# Patient Record
Sex: FEMALE | Race: WHITE | NOT HISPANIC OR LATINO | URBAN - METROPOLITAN AREA
[De-identification: names, ages, dates, MRNs, and addresses within clinical notes are randomized per-mention and may not be internally consistent; named-entity substitution may affect disease eponyms.]

---

## 2017-10-18 ENCOUNTER — OFFICE VISIT (OUTPATIENT)
Dept: CARDIOLOGY | Facility: MEDICAL CENTER | Age: 67
End: 2017-10-18
Payer: MEDICARE

## 2017-10-18 VITALS
DIASTOLIC BLOOD PRESSURE: 70 MMHG | HEART RATE: 56 BPM | BODY MASS INDEX: 34.91 KG/M2 | WEIGHT: 197 LBS | HEIGHT: 63 IN | SYSTOLIC BLOOD PRESSURE: 140 MMHG

## 2017-10-18 DIAGNOSIS — R06.09 DOE (DYSPNEA ON EXERTION): ICD-10-CM

## 2017-10-18 DIAGNOSIS — R60.0 LOCALIZED EDEMA: ICD-10-CM

## 2017-10-18 DIAGNOSIS — I10 BENIGN ESSENTIAL HYPERTENSION: Chronic | ICD-10-CM

## 2017-10-18 DIAGNOSIS — R07.9 CHEST PAIN, UNSPECIFIED TYPE: ICD-10-CM

## 2017-10-18 DIAGNOSIS — E78.2 MIXED HYPERLIPIDEMIA: Chronic | ICD-10-CM

## 2017-10-18 PROBLEM — R60.9 EDEMA: Status: ACTIVE | Noted: 2017-10-18

## 2017-10-18 LAB — EKG IMPRESSION: NORMAL

## 2017-10-18 PROCEDURE — 93000 ELECTROCARDIOGRAM COMPLETE: CPT | Performed by: INTERNAL MEDICINE

## 2017-10-18 PROCEDURE — 99203 OFFICE O/P NEW LOW 30 MIN: CPT | Performed by: INTERNAL MEDICINE

## 2017-10-18 RX ORDER — LISINOPRIL 20 MG/1
20 TABLET ORAL DAILY
Qty: 90 TAB | Refills: 3 | Status: SHIPPED | OUTPATIENT
Start: 2017-10-18 | End: 2020-03-05 | Stop reason: SDUPTHER

## 2017-10-18 RX ORDER — LISINOPRIL 20 MG/1
20 TABLET ORAL DAILY
COMMUNITY
End: 2017-10-18 | Stop reason: SDUPTHER

## 2017-10-18 RX ORDER — BUMETANIDE 0.5 MG/1
1 TABLET ORAL DAILY
Qty: 30 TAB | Refills: 3 | Status: SHIPPED | OUTPATIENT
Start: 2017-10-18 | End: 2017-11-07 | Stop reason: SDUPTHER

## 2017-10-18 ASSESSMENT — ENCOUNTER SYMPTOMS
SHORTNESS OF BREATH: 1
BRUISES/BLEEDS EASILY: 0
GASTROINTESTINAL NEGATIVE: 1
DEPRESSION: 1
CONSTITUTIONAL NEGATIVE: 1
NEUROLOGICAL NEGATIVE: 1
MUSCULOSKELETAL NEGATIVE: 1
BLOOD IN STOOL: 0

## 2017-10-18 NOTE — PROGRESS NOTES
Subjective:   Sneha Jade is a 67 y.o. female whoIs self-referred for evaluation of chest pain, exertional dyspnea and edema. She was seen once in the office in  for history of hypertension. Prior to that the patient has a long history of hypertension but has not been taking medications. At the at that time she also had mild edema. The patient was started on lisinopril 10 mg twice daily. She is currently lisinopril 20 mg a day.  About 6 weeks ago she started noticing exertional dyspnea and also after the edema that was better in the morning. She can walk a block or 2 before getting short of breath.  She is also noted left upper chest pain that is localized, not particularly positional but made worse with deep breathing.  Her cardiac risk factor profile is also for hypertension, hyperlipidemia and premature coronary disease in the family. No history of diabetes or smoking. Her dad  from a heart attack in his 50s.  She denies palpitations, orthopnea, or syncope.    Past Medical History:   Diagnosis Date   • Arm pain, left 2015   • Benign essential hypertension 2010   • Carotid artery stenosis 2/10/2010   • HLD (hyperlipidemia) 2/10/2010     Past Surgical History:   Procedure Laterality Date   • APPENDECTOMY     • TUBAL COAGULATION LAPAROSCOPIC BILATERAL       Family History   Problem Relation Age of Onset   • Heart Disease Mother    • Heart Attack Mother    • Heart Attack Father 62     History   Smoking Status   • Never Smoker   Smokeless Tobacco   • Never Used     Allergies   Allergen Reactions   • Pcn [Penicillins]      Outpatient Encounter Prescriptions as of 10/18/2017   Medication Sig Dispense Refill   • lisinopril (PRINIVIL) 20 MG Tab Take 1 Tab by mouth every day. 90 Tab 3   • bumetanide (BUMEX) 0.5 MG Tab Take 2 Tabs by mouth every day. 30 Tab 3   • [DISCONTINUED] lisinopril (PRINIVIL) 20 MG Tab Take 20 mg by mouth every day.       No facility-administered encounter medications on file  "as of 10/18/2017.      Review of Systems   Constitutional: Negative.    HENT: Negative.    Respiratory: Positive for shortness of breath.         Exertional dyspnea   Cardiovascular: Positive for chest pain and leg swelling.        Localized chest pain   Gastrointestinal: Negative.  Negative for blood in stool.   Musculoskeletal: Negative.    Skin: Negative.    Neurological: Negative.    Endo/Heme/Allergies: Negative.  Does not bruise/bleed easily.   Psychiatric/Behavioral: Positive for depression.        Objective:   /70   Pulse (!) 56   Ht 1.6 m (5' 3\")   Wt 89.4 kg (197 lb)   BMI 34.90 kg/m²     Physical Exam   Constitutional: She is oriented to person, place, and time. She appears well-developed and well-nourished. No distress.   Overweight   HENT:   Head: Atraumatic.   Eyes: Conjunctivae and EOM are normal. Pupils are equal, round, and reactive to light. No scleral icterus.   Neck: Neck supple. JVD present. No thyromegaly present.   Cardiovascular: Normal rate, regular rhythm and normal heart sounds.    No murmur heard.  Pulmonary/Chest: Effort normal and breath sounds normal. No respiratory distress. She has no wheezes. She has no rales.   Abdominal: Soft. Bowel sounds are normal. She exhibits no distension. There is no tenderness.   Musculoskeletal: She exhibits edema.   Trace to 1+ edema   Neurological: She is alert and oriented to person, place, and time.   Skin: Skin is warm and dry. She is not diaphoretic.   Psychiatric: She has a normal mood and affect.       Assessment:     1. Chest pain, unspecified type  EKG    CBC WITH DIFFERENTIAL   2. Benign essential hypertension  COMP METABOLIC PANEL    LIPID PROFILE   3. Mixed hyperlipidemia  LIPID PROFILE   4. Localized edema  COMP METABOLIC PANEL   5. MERCADO (dyspnea on exertion)  COMP METABOLIC PANEL    CBC WITH DIFFERENTIAL    BTYPE NATRIURETIC PEPTIDE    ECHOCARDIOGRAM COMP W/O CONT       Medical Decision Making:  Today's Assessment / Status / Plan: "   Her exam is notable for mild JVD. Cardiac exam is benign. She has trace to 1+ edema. It may be that she developed LV dysfunction from years of hypertension with a long period of untreated hypertension. Other etiologies need to be considered as well including anemia and kidney disease. Appropriate laboratories will be drawn including CBC, BNP, and CMP. An echo be obtained to evaluate her LV function.  She has history of chest pain and on exam has very definite left 4th costochondral pain to palpation which mimics her pain that she been having. I don't think she is having anginal pain but should have stress testing.  The patient has known carotid artery disease and had moderate bilateral carotid stenosis by ultrasound back in 2010. At some point this should be repeated.  She was started on bumetanide 0.5 mg daily in addition to her continued lisinopril. Recommend checking potassium and  Visit. She'll be notified of the results of her lab and echo.

## 2017-10-18 NOTE — LETTER
[facility]   [address] [place] [address] [address]  [place] [address]  Phone: [phone]  Fax: [phone]              Sneha Jade  1950    Encounter Date: 10/18/2017    Frederic Boateng M.D.          PROGRESS NOTE:  Subjective:   Sneha Jade is a 67 y.o. female whoIs self-referred for evaluation of chest pain, exertional dyspnea and edema. She was seen once in the office in  for history of hypertension. Prior to that the patient has a long history of hypertension but has not been taking medications. At the at that time she also had mild edema. The patient was started on lisinopril 10 mg twice daily. She is currently lisinopril 20 mg a day.  About 6 weeks ago she started noticing exertional dyspnea and also after the edema that was better in the morning. She can walk a block or 2 before getting short of breath.  She is also noted left upper chest pain that is localized, not particularly positional but made worse with deep breathing.  Her cardiac risk factor profile is also for hypertension, hyperlipidemia and premature coronary disease in the family. No history of diabetes or smoking. Her dad  from a heart attack in his 50s.  She denies palpitations, orthopnea, or syncope.    Past Medical History:   Diagnosis Date   • Arm pain, left 2015   • Benign essential hypertension 2010   • Carotid artery stenosis 2/10/2010   • HLD (hyperlipidemia) 2/10/2010     Past Surgical History:   Procedure Laterality Date   • APPENDECTOMY     • TUBAL COAGULATION LAPAROSCOPIC BILATERAL       Family History   Problem Relation Age of Onset   • Heart Disease Mother    • Heart Attack Mother    • Heart Attack Father 62     History   Smoking Status   • Never Smoker   Smokeless Tobacco   • Never Used     Allergies   Allergen Reactions   • Pcn [Penicillins]      Outpatient Encounter Prescriptions as of 10/18/2017   Medication Sig Dispense Refill   • lisinopril (PRINIVIL) 20 MG Tab  "Take 1 Tab by mouth every day. 90 Tab 3   • bumetanide (BUMEX) 0.5 MG Tab Take 2 Tabs by mouth every day. 30 Tab 3   • [DISCONTINUED] lisinopril (PRINIVIL) 20 MG Tab Take 20 mg by mouth every day.       No facility-administered encounter medications on file as of 10/18/2017.      Review of Systems   Constitutional: Negative.    HENT: Negative.    Respiratory: Positive for shortness of breath.         Exertional dyspnea   Cardiovascular: Positive for chest pain and leg swelling.        Localized chest pain   Gastrointestinal: Negative.  Negative for blood in stool.   Musculoskeletal: Negative.    Skin: Negative.    Neurological: Negative.    Endo/Heme/Allergies: Negative.  Does not bruise/bleed easily.   Psychiatric/Behavioral: Positive for depression.        Objective:   /70   Pulse (!) 56   Ht 1.6 m (5' 3\")   Wt 89.4 kg (197 lb)   BMI 34.90 kg/m²      Physical Exam   Constitutional: She is oriented to person, place, and time. She appears well-developed and well-nourished. No distress.   Overweight   HENT:   Head: Atraumatic.   Eyes: Conjunctivae and EOM are normal. Pupils are equal, round, and reactive to light. No scleral icterus.   Neck: Neck supple. JVD present. No thyromegaly present.   Cardiovascular: Normal rate, regular rhythm and normal heart sounds.    No murmur heard.  Pulmonary/Chest: Effort normal and breath sounds normal. No respiratory distress. She has no wheezes. She has no rales.   Abdominal: Soft. Bowel sounds are normal. She exhibits no distension. There is no tenderness.   Musculoskeletal: She exhibits edema.   Trace to 1+ edema   Neurological: She is alert and oriented to person, place, and time.   Skin: Skin is warm and dry. She is not diaphoretic.   Psychiatric: She has a normal mood and affect.       Assessment:     1. Chest pain, unspecified type  EKG    CBC WITH DIFFERENTIAL   2. Benign essential hypertension  COMP METABOLIC PANEL    LIPID PROFILE   3. Mixed hyperlipidemia  LIPID " PROFILE   4. Localized edema  COMP METABOLIC PANEL   5. MERCADO (dyspnea on exertion)  COMP METABOLIC PANEL    CBC WITH DIFFERENTIAL    BTYPE NATRIURETIC PEPTIDE    ECHOCARDIOGRAM COMP W/O CONT       Medical Decision Making:  Today's Assessment / Status / Plan:   Her exam is notable for mild JVD. Cardiac exam is benign. She has trace to 1+ edema. It may be that she developed LV dysfunction from years of hypertension with a long period of untreated hypertension. Other etiologies need to be considered as well including anemia and kidney disease. Appropriate laboratories will be drawn including CBC, BNP, and CMP. An echo be obtained to evaluate her LV function.  She has history of chest pain and on exam has very definite left 4th costochondral pain to palpation which mimics her pain that she been having. I don't think she is having anginal pain but should have stress testing.  The patient has known carotid artery disease and had moderate bilateral carotid stenosis by ultrasound back in 2010. At some point this should be repeated.  She was started on bumetanide 0.5 mg daily in addition to her continued lisinopril. Recommend checking potassium and  Visit. She'll be notified of the results of her lab and echo.      ZORAN Rodríguez.  [address]  [facility] [address]  VIA In Basket

## 2017-10-23 ENCOUNTER — HOSPITAL ENCOUNTER (OUTPATIENT)
Dept: LAB | Facility: MEDICAL CENTER | Age: 67
End: 2017-10-23
Attending: INTERNAL MEDICINE
Payer: MEDICARE

## 2017-10-23 DIAGNOSIS — R60.0 LOCALIZED EDEMA: ICD-10-CM

## 2017-10-23 DIAGNOSIS — E78.2 MIXED HYPERLIPIDEMIA: Chronic | ICD-10-CM

## 2017-10-23 DIAGNOSIS — R06.09 DOE (DYSPNEA ON EXERTION): ICD-10-CM

## 2017-10-23 DIAGNOSIS — I10 BENIGN ESSENTIAL HYPERTENSION: Chronic | ICD-10-CM

## 2017-10-23 DIAGNOSIS — R07.9 CHEST PAIN, UNSPECIFIED TYPE: ICD-10-CM

## 2017-10-23 LAB
ALBUMIN SERPL BCP-MCNC: 4.1 G/DL (ref 3.2–4.9)
ALBUMIN/GLOB SERPL: 1.4 G/DL
ALP SERPL-CCNC: 26 U/L (ref 30–99)
ALT SERPL-CCNC: 19 U/L (ref 2–50)
ANION GAP SERPL CALC-SCNC: 6 MMOL/L (ref 0–11.9)
AST SERPL-CCNC: 21 U/L (ref 12–45)
BASOPHILS # BLD AUTO: 1.2 % (ref 0–1.8)
BASOPHILS # BLD: 0.09 K/UL (ref 0–0.12)
BILIRUB SERPL-MCNC: 0.5 MG/DL (ref 0.1–1.5)
BNP SERPL-MCNC: 45 PG/ML (ref 0–100)
BUN SERPL-MCNC: 17 MG/DL (ref 8–22)
CALCIUM SERPL-MCNC: 9.9 MG/DL (ref 8.5–10.5)
CHLORIDE SERPL-SCNC: 108 MMOL/L (ref 96–112)
CHOLEST SERPL-MCNC: 191 MG/DL (ref 100–199)
CO2 SERPL-SCNC: 27 MMOL/L (ref 20–33)
CREAT SERPL-MCNC: 1.03 MG/DL (ref 0.5–1.4)
EOSINOPHIL # BLD AUTO: 0.18 K/UL (ref 0–0.51)
EOSINOPHIL NFR BLD: 2.4 % (ref 0–6.9)
ERYTHROCYTE [DISTWIDTH] IN BLOOD BY AUTOMATED COUNT: 41.5 FL (ref 35.9–50)
GFR SERPL CREATININE-BSD FRML MDRD: 54 ML/MIN/1.73 M 2
GLOBULIN SER CALC-MCNC: 2.9 G/DL (ref 1.9–3.5)
GLUCOSE SERPL-MCNC: 99 MG/DL (ref 65–99)
HCT VFR BLD AUTO: 42.4 % (ref 37–47)
HDLC SERPL-MCNC: 40 MG/DL
HGB BLD-MCNC: 14.1 G/DL (ref 12–16)
IMM GRANULOCYTES # BLD AUTO: 0.03 K/UL (ref 0–0.11)
IMM GRANULOCYTES NFR BLD AUTO: 0.4 % (ref 0–0.9)
LDLC SERPL CALC-MCNC: 125 MG/DL
LYMPHOCYTES # BLD AUTO: 2.62 K/UL (ref 1–4.8)
LYMPHOCYTES NFR BLD: 35.1 % (ref 22–41)
MCH RBC QN AUTO: 30.1 PG (ref 27–33)
MCHC RBC AUTO-ENTMCNC: 33.3 G/DL (ref 33.6–35)
MCV RBC AUTO: 90.4 FL (ref 81.4–97.8)
MONOCYTES # BLD AUTO: 0.6 K/UL (ref 0–0.85)
MONOCYTES NFR BLD AUTO: 8 % (ref 0–13.4)
NEUTROPHILS # BLD AUTO: 3.94 K/UL (ref 2–7.15)
NEUTROPHILS NFR BLD: 52.9 % (ref 44–72)
NRBC # BLD AUTO: 0 K/UL
NRBC BLD AUTO-RTO: 0 /100 WBC
PLATELET # BLD AUTO: 213 K/UL (ref 164–446)
PMV BLD AUTO: 11.5 FL (ref 9–12.9)
POTASSIUM SERPL-SCNC: 4.9 MMOL/L (ref 3.6–5.5)
PROT SERPL-MCNC: 7 G/DL (ref 6–8.2)
RBC # BLD AUTO: 4.69 M/UL (ref 4.2–5.4)
SODIUM SERPL-SCNC: 141 MMOL/L (ref 135–145)
TRIGL SERPL-MCNC: 130 MG/DL (ref 0–149)
WBC # BLD AUTO: 7.5 K/UL (ref 4.8–10.8)

## 2017-10-23 PROCEDURE — 85025 COMPLETE CBC W/AUTO DIFF WBC: CPT

## 2017-10-23 PROCEDURE — 80061 LIPID PANEL: CPT

## 2017-10-23 PROCEDURE — 36415 COLL VENOUS BLD VENIPUNCTURE: CPT

## 2017-10-23 PROCEDURE — 83880 ASSAY OF NATRIURETIC PEPTIDE: CPT

## 2017-10-23 PROCEDURE — 80053 COMPREHEN METABOLIC PANEL: CPT

## 2017-10-25 ENCOUNTER — TELEPHONE (OUTPATIENT)
Dept: CARDIOLOGY | Facility: MEDICAL CENTER | Age: 67
End: 2017-10-25

## 2017-10-25 NOTE — TELEPHONE ENCOUNTER
----- Message from Khadijah sEpino R.N. sent at 10/25/2017  8:26 AM PDT -----      ----- Message -----  From: Frederic Boateng M.D.  Sent: 10/25/2017   7:50 AM  To: Laura Banks L.P.N.    Labs reviewed.  Potassium is normal.  LDL is 125 which is fine for her  BNP is only 45, so no evidence of CF or weak heart muscle.  Is edema better on butmetanide?

## 2017-10-25 NOTE — TELEPHONE ENCOUNTER
Spoke with pt, discussed lab results per RS, pt verbalizes understanding, pt reports edema is better.

## 2017-11-01 ENCOUNTER — HOSPITAL ENCOUNTER (OUTPATIENT)
Dept: CARDIOLOGY | Facility: MEDICAL CENTER | Age: 67
End: 2017-11-01
Attending: INTERNAL MEDICINE
Payer: MEDICARE

## 2017-11-01 DIAGNOSIS — R06.09 DOE (DYSPNEA ON EXERTION): ICD-10-CM

## 2017-11-01 PROCEDURE — 93306 TTE W/DOPPLER COMPLETE: CPT

## 2017-11-01 PROCEDURE — 93306 TTE W/DOPPLER COMPLETE: CPT | Mod: 26 | Performed by: INTERNAL MEDICINE

## 2017-11-02 LAB
LV EJECT FRACT  99904: 75
LV EJECT FRACT MOD 2C 99903: 81.68
LV EJECT FRACT MOD 4C 99902: 81.86
LV EJECT FRACT MOD BP 99901: 80.76

## 2017-11-07 ENCOUNTER — OFFICE VISIT (OUTPATIENT)
Dept: CARDIOLOGY | Facility: MEDICAL CENTER | Age: 67
End: 2017-11-07
Payer: MEDICARE

## 2017-11-07 VITALS
BODY MASS INDEX: 35.08 KG/M2 | WEIGHT: 198 LBS | OXYGEN SATURATION: 97 % | HEART RATE: 70 BPM | HEIGHT: 63 IN | DIASTOLIC BLOOD PRESSURE: 72 MMHG | SYSTOLIC BLOOD PRESSURE: 128 MMHG

## 2017-11-07 DIAGNOSIS — I65.29 STENOSIS OF CAROTID ARTERY, UNSPECIFIED LATERALITY: Chronic | ICD-10-CM

## 2017-11-07 DIAGNOSIS — I10 BENIGN ESSENTIAL HYPERTENSION: Chronic | ICD-10-CM

## 2017-11-07 DIAGNOSIS — E78.2 MIXED HYPERLIPIDEMIA: Chronic | ICD-10-CM

## 2017-11-07 DIAGNOSIS — R06.09 DOE (DYSPNEA ON EXERTION): ICD-10-CM

## 2017-11-07 DIAGNOSIS — R60.0 LOCALIZED EDEMA: ICD-10-CM

## 2017-11-07 PROCEDURE — 99214 OFFICE O/P EST MOD 30 MIN: CPT | Performed by: NURSE PRACTITIONER

## 2017-11-07 RX ORDER — BUMETANIDE 0.5 MG/1
1 TABLET ORAL PRN
Qty: 30 TAB | Refills: 11 | COMMUNITY
Start: 2017-11-07

## 2017-11-07 ASSESSMENT — ENCOUNTER SYMPTOMS
SHORTNESS OF BREATH: 0
ABDOMINAL PAIN: 0
FEVER: 0
PALPITATIONS: 0
DIZZINESS: 0
MYALGIAS: 0
PND: 0
COUGH: 0
CLAUDICATION: 0
ORTHOPNEA: 0

## 2017-11-07 NOTE — LETTER
[facility]   [address]  [place] [address]  Phone: [phone]  Fax: [phone]              Sneha Jade  1950    Encounter Date: 11/7/2017    RAZIA Martinez          PROGRESS NOTE:  Subjective:   Sneha Jade is a 67 y.o. female who presents today for 1 month follow up on echocardiogram and lab results.    She is a patient of Dr. Boateng in our office. Hx of HTN, obesity, carotid artery stenosis, and LE edema in summer months.    She has no concerns except for her bill and that she was accidentally taking citalopram regularly because she thought this was her BP medication. She has now been consistently taking lisinopril as prescribed and is not taking bumex because she hasn't had any swelling.    She has had no episodes of chest pain, palpitations, dizziness/lightheadedness, shortness of breath, orthopnea, or peripheral edema.    Past Medical History:   Diagnosis Date   • Arm pain, left 6/16/2015   • Benign essential hypertension 2/24/2010   • Carotid artery stenosis 2/10/2010   • HLD (hyperlipidemia) 2/10/2010     Past Surgical History:   Procedure Laterality Date   • APPENDECTOMY  2007   • TUBAL COAGULATION LAPAROSCOPIC BILATERAL       Family History   Problem Relation Age of Onset   • Heart Disease Mother    • Heart Attack Mother    • Heart Attack Father 62     History   Smoking Status   • Never Smoker   Smokeless Tobacco   • Never Used     Allergies   Allergen Reactions   • Pcn [Penicillins]      Outpatient Encounter Prescriptions as of 11/7/2017   Medication Sig Dispense Refill   • bumetanide (BUMEX) 0.5 MG Tab Take 2 Tabs by mouth as needed (swelling in legs). 30 Tab 11   • lisinopril (PRINIVIL) 20 MG Tab Take 1 Tab by mouth every day. 90 Tab 3   • [DISCONTINUED] bumetanide (BUMEX) 0.5 MG Tab Take 2 Tabs by mouth every day. 30 Tab 3     No facility-administered encounter medications on file as of 11/7/2017.      Review  "of Systems   Constitutional: Negative for fever and malaise/fatigue.   Respiratory: Negative for cough and shortness of breath.    Cardiovascular: Negative for chest pain, palpitations, orthopnea, claudication, leg swelling and PND.   Gastrointestinal: Negative for abdominal pain.   Musculoskeletal: Negative for myalgias.   Neurological: Negative for dizziness.   All other systems reviewed and are negative.       Objective:   /72   Pulse 70   Ht 1.6 m (5' 3\")   Wt 89.8 kg (198 lb)   SpO2 97%   BMI 35.07 kg/m²      Physical Exam   Constitutional: She is oriented to person, place, and time. She appears well-developed. No distress.   obese   HENT:   Head: Normocephalic and atraumatic.   Eyes: EOM are normal.   Neck: Normal range of motion. No JVD present.   Cardiovascular: Normal rate, regular rhythm, normal heart sounds and intact distal pulses.    No murmur heard.  Pulmonary/Chest: Effort normal and breath sounds normal. No respiratory distress.   Abdominal: Soft. Bowel sounds are normal.   Musculoskeletal: Normal range of motion. She exhibits no edema.   Neurological: She is alert and oriented to person, place, and time.   Skin: Skin is warm and dry.   Psychiatric: She has a normal mood and affect.   Nursing note and vitals reviewed.      Assessment:     1. Benign essential hypertension  TREADMILL STRESS   2. Stenosis of carotid artery, unspecified laterality  TREADMILL STRESS    CAROTID DUPLEX   3. Mixed hyperlipidemia  TREADMILL STRESS    CAROTID DUPLEX   4. MERCADO (dyspnea on exertion)  TREADMILL STRESS   5. Localized edema  TREADMILL STRESS     Medical Decision Making:  Today's Assessment / Status / Plan:     1. HTN, good control on lisinopril 20 mg QD, okay to use bumex PRN for edema.    2. Carotid artery stenosis, check duplex within year. No ASA, statin, consider at next apt with results.    3. HLD, moderate control. Recommend dietary and exercise changes. Mediterranean diet information given. She " will work on this. No pharmacological therapy, follow yearly with CMP and lipid.    4. MERCADO and edema, not present. Only during summer months. Recommend hydration. She is agreeable. Echo WNL. Pending stress imaging per Tasneem's note. She will do this soon.    FU in clinic in 6-12 months with RS with review of carotids, stress imaging, and repeat labs yearly    Patient verbalizes understanding and agrees with the plan of care.     Collaborating MD: Brenda RODRIGUEZ        No Recipients

## 2017-11-07 NOTE — PROGRESS NOTES
Subjective:   Sneha Jade is a 67 y.o. female who presents today for 1 month follow up on echocardiogram and lab results.    She is a patient of Dr. Boateng in our office. Hx of HTN, obesity, carotid artery stenosis, and LE edema in summer months.    She has no concerns except for her bill and that she was accidentally taking citalopram regularly because she thought this was her BP medication. She has now been consistently taking lisinopril as prescribed and is not taking bumex because she hasn't had any swelling.    She has had no episodes of chest pain, palpitations, dizziness/lightheadedness, shortness of breath, orthopnea, or peripheral edema.    Past Medical History:   Diagnosis Date   • Arm pain, left 6/16/2015   • Benign essential hypertension 2/24/2010   • Carotid artery stenosis 2/10/2010   • HLD (hyperlipidemia) 2/10/2010     Past Surgical History:   Procedure Laterality Date   • APPENDECTOMY  2007   • TUBAL COAGULATION LAPAROSCOPIC BILATERAL       Family History   Problem Relation Age of Onset   • Heart Disease Mother    • Heart Attack Mother    • Heart Attack Father 62     History   Smoking Status   • Never Smoker   Smokeless Tobacco   • Never Used     Allergies   Allergen Reactions   • Pcn [Penicillins]      Outpatient Encounter Prescriptions as of 11/7/2017   Medication Sig Dispense Refill   • bumetanide (BUMEX) 0.5 MG Tab Take 2 Tabs by mouth as needed (swelling in legs). 30 Tab 11   • lisinopril (PRINIVIL) 20 MG Tab Take 1 Tab by mouth every day. 90 Tab 3   • [DISCONTINUED] bumetanide (BUMEX) 0.5 MG Tab Take 2 Tabs by mouth every day. 30 Tab 3     No facility-administered encounter medications on file as of 11/7/2017.      Review of Systems   Constitutional: Negative for fever and malaise/fatigue.   Respiratory: Negative for cough and shortness of breath.    Cardiovascular: Negative for chest pain, palpitations, orthopnea, claudication, leg swelling and PND.   Gastrointestinal: Negative for  "abdominal pain.   Musculoskeletal: Negative for myalgias.   Neurological: Negative for dizziness.   All other systems reviewed and are negative.       Objective:   /72   Pulse 70   Ht 1.6 m (5' 3\")   Wt 89.8 kg (198 lb)   SpO2 97%   BMI 35.07 kg/m²     Physical Exam   Constitutional: She is oriented to person, place, and time. She appears well-developed. No distress.   obese   HENT:   Head: Normocephalic and atraumatic.   Eyes: EOM are normal.   Neck: Normal range of motion. No JVD present.   Cardiovascular: Normal rate, regular rhythm, normal heart sounds and intact distal pulses.    No murmur heard.  Pulmonary/Chest: Effort normal and breath sounds normal. No respiratory distress.   Abdominal: Soft. Bowel sounds are normal.   Musculoskeletal: Normal range of motion. She exhibits no edema.   Neurological: She is alert and oriented to person, place, and time.   Skin: Skin is warm and dry.   Psychiatric: She has a normal mood and affect.   Nursing note and vitals reviewed.      Assessment:     1. Benign essential hypertension  TREADMILL STRESS   2. Stenosis of carotid artery, unspecified laterality  TREADMILL STRESS    CAROTID DUPLEX   3. Mixed hyperlipidemia  TREADMILL STRESS    CAROTID DUPLEX   4. MERCADO (dyspnea on exertion)  TREADMILL STRESS   5. Localized edema  TREADMILL STRESS     Medical Decision Making:  Today's Assessment / Status / Plan:     1. HTN, good control on lisinopril 20 mg QD, okay to use bumex PRN for edema.    2. Carotid artery stenosis, check duplex within year. No ASA, statin, consider at next apt with results.    3. HLD, moderate control. Recommend dietary and exercise changes. Mediterranean diet information given. She will work on this. No pharmacological therapy, follow yearly with CMP and lipid.    4. MERCADO and edema, not present. Only during summer months. Recommend hydration. She is agreeable. Echo WNL. Pending stress imaging per Tasneem's note. She will do this soon.    FU in clinic " in 6-12 months with RS with review of carotids, stress imaging, and repeat labs yearly    Patient verbalizes understanding and agrees with the plan of care.     Collaborating MD: Brenda RODRIGUEZ

## 2019-11-19 ENCOUNTER — NON-PROVIDER VISIT (OUTPATIENT)
Dept: MEDICAL GROUP | Facility: CLINIC | Age: 69
End: 2019-11-19
Payer: MEDICARE

## 2019-11-19 ENCOUNTER — HOSPITAL ENCOUNTER (OUTPATIENT)
Dept: LAB | Facility: MEDICAL CENTER | Age: 69
End: 2019-11-19
Attending: NURSE PRACTITIONER
Payer: MEDICARE

## 2019-11-19 PROCEDURE — 82607 VITAMIN B-12: CPT

## 2019-11-19 PROCEDURE — 99000 SPECIMEN HANDLING OFFICE-LAB: CPT | Performed by: PHYSICIAN ASSISTANT

## 2019-11-19 PROCEDURE — 36415 COLL VENOUS BLD VENIPUNCTURE: CPT | Performed by: PHYSICIAN ASSISTANT

## 2019-11-19 PROCEDURE — 80061 LIPID PANEL: CPT

## 2019-11-19 PROCEDURE — 80053 COMPREHEN METABOLIC PANEL: CPT

## 2019-11-20 LAB
ALBUMIN SERPL BCP-MCNC: 4.2 G/DL (ref 3.2–4.9)
ALBUMIN/GLOB SERPL: 1.4 G/DL
ALP SERPL-CCNC: 30 U/L (ref 30–99)
ALT SERPL-CCNC: 18 U/L (ref 2–50)
ANION GAP SERPL CALC-SCNC: 9 MMOL/L (ref 0–11.9)
AST SERPL-CCNC: 19 U/L (ref 12–45)
BILIRUB SERPL-MCNC: 0.6 MG/DL (ref 0.1–1.5)
BUN SERPL-MCNC: 30 MG/DL (ref 8–22)
CALCIUM SERPL-MCNC: 9.3 MG/DL (ref 8.5–10.5)
CHLORIDE SERPL-SCNC: 108 MMOL/L (ref 96–112)
CHOLEST SERPL-MCNC: 145 MG/DL (ref 100–199)
CO2 SERPL-SCNC: 25 MMOL/L (ref 20–33)
CREAT SERPL-MCNC: 1.14 MG/DL (ref 0.5–1.4)
GLOBULIN SER CALC-MCNC: 3.1 G/DL (ref 1.9–3.5)
GLUCOSE SERPL-MCNC: 98 MG/DL (ref 65–99)
HDLC SERPL-MCNC: 46 MG/DL
LDLC SERPL CALC-MCNC: 80 MG/DL
POTASSIUM SERPL-SCNC: 4.6 MMOL/L (ref 3.6–5.5)
PROT SERPL-MCNC: 7.3 G/DL (ref 6–8.2)
SODIUM SERPL-SCNC: 142 MMOL/L (ref 135–145)
TRIGL SERPL-MCNC: 95 MG/DL (ref 0–149)
VIT B12 SERPL-MCNC: >1500 PG/ML (ref 211–911)

## 2020-03-05 RX ORDER — ATORVASTATIN CALCIUM 10 MG/1
10 TABLET, FILM COATED ORAL DAILY
COMMUNITY
Start: 2020-02-04 | End: 2020-03-05 | Stop reason: SDUPTHER

## 2020-03-05 RX ORDER — ATORVASTATIN CALCIUM 10 MG/1
10 TABLET, FILM COATED ORAL DAILY
Qty: 15 TAB | Refills: 0 | Status: SHIPPED | OUTPATIENT
Start: 2020-03-05 | End: 2020-03-12 | Stop reason: SDUPTHER

## 2020-03-05 RX ORDER — LISINOPRIL 20 MG/1
20 TABLET ORAL DAILY
Qty: 15 TAB | Refills: 0 | Status: SHIPPED
Start: 2020-03-05 | End: 2020-03-12

## 2020-03-05 NOTE — TELEPHONE ENCOUNTER
Pt had an apt this week to establish with you however you were not here. She has another apt neelam but not till the 12th. Is almost out of pills.     Was the patient seen in the last year in this department? No     Does patient have an active prescription for medications requested? Yes    Received Request Via: Patient    Hospital Outpatient Visit on 11/19/2019   Component Date Value   • GFR If  11/19/2019 57*   • GFR If Non  Ameri* 11/19/2019 47*   • Sodium 11/19/2019 142    • Potassium 11/19/2019 4.6    • Chloride 11/19/2019 108    • Co2 11/19/2019 25    • Anion Gap 11/19/2019 9.0    • Glucose 11/19/2019 98    • Bun 11/19/2019 30*   • Creatinine 11/19/2019 1.14    • Calcium 11/19/2019 9.3    • AST(SGOT) 11/19/2019 19    • ALT(SGPT) 11/19/2019 18    • Alkaline Phosphatase 11/19/2019 30    • Total Bilirubin 11/19/2019 0.6    • Albumin 11/19/2019 4.2    • Total Protein 11/19/2019 7.3    • Globulin 11/19/2019 3.1    • A-G Ratio 11/19/2019 1.4    • Vitamin B12 -True Cobala* 11/19/2019 >1500*   • Cholesterol,Tot 11/19/2019 145    • Triglycerides 11/19/2019 95    • HDL 11/19/2019 46    • LDL 11/19/2019 80    ]

## 2020-03-12 ENCOUNTER — OFFICE VISIT (OUTPATIENT)
Dept: MEDICAL GROUP | Facility: CLINIC | Age: 70
End: 2020-03-12
Payer: MEDICARE

## 2020-03-12 VITALS
HEART RATE: 76 BPM | TEMPERATURE: 97.6 F | HEIGHT: 62 IN | WEIGHT: 198 LBS | SYSTOLIC BLOOD PRESSURE: 132 MMHG | OXYGEN SATURATION: 97 % | RESPIRATION RATE: 16 BRPM | BODY MASS INDEX: 36.44 KG/M2 | DIASTOLIC BLOOD PRESSURE: 80 MMHG

## 2020-03-12 DIAGNOSIS — R41.3 MEMORY LOSS OF UNKNOWN CAUSE: ICD-10-CM

## 2020-03-12 DIAGNOSIS — R61 EXCESSIVE SWEATING: ICD-10-CM

## 2020-03-12 DIAGNOSIS — E78.2 MIXED HYPERLIPIDEMIA: Chronic | ICD-10-CM

## 2020-03-12 DIAGNOSIS — M79.605 PAIN IN BOTH LOWER EXTREMITIES: ICD-10-CM

## 2020-03-12 DIAGNOSIS — N18.30 CHRONIC KIDNEY DISEASE (CKD), STAGE III (MODERATE): ICD-10-CM

## 2020-03-12 DIAGNOSIS — E66.9 OBESITY (BMI 30-39.9): ICD-10-CM

## 2020-03-12 DIAGNOSIS — F48.9 MENTAL AND BEHAVIORAL PROBLEM IN ADULT: ICD-10-CM

## 2020-03-12 DIAGNOSIS — G47.00 INSOMNIA, UNSPECIFIED TYPE: ICD-10-CM

## 2020-03-12 DIAGNOSIS — M79.604 PAIN IN BOTH LOWER EXTREMITIES: ICD-10-CM

## 2020-03-12 DIAGNOSIS — F69 MENTAL AND BEHAVIORAL PROBLEM IN ADULT: ICD-10-CM

## 2020-03-12 DIAGNOSIS — I10 BENIGN ESSENTIAL HYPERTENSION: Chronic | ICD-10-CM

## 2020-03-12 PROCEDURE — 99204 OFFICE O/P NEW MOD 45 MIN: CPT | Performed by: PHYSICIAN ASSISTANT

## 2020-03-12 RX ORDER — LISINOPRIL 30 MG/1
30 TABLET ORAL DAILY
Qty: 90 TAB | Refills: 0 | Status: SHIPPED | OUTPATIENT
Start: 2020-03-12

## 2020-03-12 RX ORDER — ATORVASTATIN CALCIUM 10 MG/1
10 TABLET, FILM COATED ORAL DAILY
Qty: 15 TAB | Refills: 0 | Status: SHIPPED | OUTPATIENT
Start: 2020-03-12

## 2020-03-12 RX ORDER — LISINOPRIL 30 MG/1
TABLET ORAL
COMMUNITY
Start: 2020-02-04 | End: 2020-03-12 | Stop reason: SDUPTHER

## 2020-03-12 ASSESSMENT — PATIENT HEALTH QUESTIONNAIRE - PHQ9
SUM OF ALL RESPONSES TO PHQ QUESTIONS 1-9: 9
5. POOR APPETITE OR OVEREATING: 3 - NEARLY EVERY DAY
CLINICAL INTERPRETATION OF PHQ2 SCORE: 1

## 2020-03-12 NOTE — ASSESSMENT & PLAN NOTE
Lab Results   Component Value Date/Time    CHOLSTRLTOT 145 11/19/2019 07:20 AM    LDL 80 11/19/2019 07:20 AM    HDL 46 11/19/2019 07:20 AM    TRIGLYCERIDE 95 11/19/2019 07:20 AM      Most recent labs as above. Continues to denies blurry vision or headache. Is currently taking a statin medicine with good control. Does not modify diet to reduce cholesterol intake. Does not exercise. Patient denies adverse medication side effects including myalgias.

## 2020-03-12 NOTE — ASSESSMENT & PLAN NOTE
This patient states that every night she wakes up with leg pain in bilateral legs that she feels is coming from her bone.  She states she has tried ibuprofen with only mild relief of this pain and has not tried any other medications.  She states this is been occurring for at least 2 years but she has never seen a medical provider to discuss this.

## 2020-03-12 NOTE — LETTER
[facility]  Renata Tucker P.A.-C.  [address]  [facility] [address]  Fax: [phone]   Authorization for Release/Disclosure of   Protected Health Information   Name: VINAYAK LUNDBERG : 1950 SSN: [id number]   Address: [address] [place] [address] Phone:    [phone] (home)    I authorize the entity listed below to release/disclose the PHI below to:   [facility]/Renata Tucker P.A.-C. and Renata Tucker P.A.-C.   Provider or Entity Name: [facility], [address]  [address] [place] [address]      Phone: [phone]       Fax: [phone]     Reason for request: continuity of care   Information to be released:    [X] LAST COLONOSCOPY,  including any PATH REPORT and follow-up  [ X] LAST FIT/COLOGUARD RESULT [X] LAST DEXA  [ X] LAST MAMMOGRAM  [X] LAST PAP  [ X] LAST LABS [ X] RETINA EXAM REPORT  [  ] IMMUNIZATION RECORDS  [ X ] Release all info  [X] LAST OFFICE VISIT      [  ] Check here and initial the line next to each item to release ALL health information INCLUDING  _____ Care and treatment for drug and / or alcohol abuse  _____ HIV testing, infection status, or AIDS  _____ Genetic Testing    DATES OF SERVICE OR TIME PERIOD TO BE DISCLOSED: _____________  I understand and acknowledge that:  * This Authorization may be revoked at any time by you in writing, except if your health information has already been used or disclosed.  * Your health information that will be used or disclosed as a result of you signing this authorization could be re-disclosed by the recipient. If this occurs, your re-disclosed health information may no longer be protected by State or Federal laws.  * You may refuse to sign this Authorization. Your refusal will not affect your ability to obtain treatment.  * This Authorization becomes effective upon signing and will  on (date) __________.      If no date is indicated,  this Authorization will  one (1) year from the signature date.    Name: Sneha Jade    Signature:   Date:     3/12/2020       PLEASE FAX REQUESTED RECORDS BACK TO: [phone]

## 2020-03-12 NOTE — ASSESSMENT & PLAN NOTE
"Component      Latest Ref Rng & Units 10/23/2017 11/19/2019           9:27 AM  7:20 AM   GFR If Non African American      >60 mL/min/1.73 m 2 54 (A) 47 (A)   Most recent labs as above.  This patient is unaware that a renal diet exists and a printout from [facility] was provided to her today.  She states she does eat a lot of red meat and does frequently take \"joint relief\" pills, although she is unsure of what medication is in these pills. She does have mild mid-back pain but denies having dark or foul-smelling urine.    "

## 2020-03-12 NOTE — PROGRESS NOTES
Chief Complaint   Patient presents with   • Establish Care     blood pressure meds / est care /        HISTORY OF THE PRESENT ILLNESS: This is a 69 y.o. female new patient to our practice who presents today for evaluation and management of:    Insomnia  Has tried lunesta and ambien for this and wishes to restart ambien but doesn't want to go through another referral process as she recently went through a referral process with a clinic who, eventually informed her that they do not take her insurance. She is non-specific about her poor sleep, does snore and doesn't have excessive daytime somnolence.       HLD (hyperlipidemia)  Lab Results   Component Value Date/Time    CHOLSTRLTOT 145 11/19/2019 07:20 AM    LDL 80 11/19/2019 07:20 AM    HDL 46 11/19/2019 07:20 AM    TRIGLYCERIDE 95 11/19/2019 07:20 AM      Most recent labs as above. Continues to denies blurry vision or headache. Is currently taking a statin medicine with good control. Does not modify diet to reduce cholesterol intake. Does not exercise. Patient denies adverse medication side effects including myalgias.         Mental and behavioral problem in adult  Patient states she has PTSD. We are attempting to obtain record of this. She seems confused today initially not knowing what her trauma was and changing the subject about her trauma into insomnia, and requesting no medication but also requesting ambien for sleep. She scores a 17/30 on her MOCA today with reduced scores in all categories. She does well in language and moderately well in attention but has decreases in visuospatial and executive functioning and poor memory.     Chronic kidney disease (CKD), stage III (moderate) (HCC)  Component      Latest Ref Rng & Units 10/23/2017 11/19/2019           9:27 AM  7:20 AM   GFR If Non African American      >60 mL/min/1.73 m 2 54 (A) 47 (A)   Most recent labs as above.  This patient is unaware that a renal diet exists and a printout from [facility] was  "provided to her today.  She states she does eat a lot of red meat and does frequently take \"joint relief\" pills, although she is unsure of what medication is in these pills. She does have mild mid-back pain but denies having dark or foul-smelling urine.      Benign essential hypertension  Possibly secondary to reduced kidney function, this is well- controlled at this time. She is is requesting medication refills.     Pain in both lower extremities  This patient states that every night she wakes up with leg pain in bilateral legs that she feels is coming from her bone.  She states she has tried ibuprofen with only mild relief of this pain and has not tried any other medications.  She states this is been occurring for at least 2 years but she has never seen a medical provider to discuss this.      Past Medical History:   Diagnosis Date   • Arm pain, left 2015   • Benign essential hypertension 2010   • Carotid artery stenosis 2/10/2010   • HLD (hyperlipidemia) 2/10/2010       Past Surgical History:   Procedure Laterality Date   • APPENDECTOMY     • TUBAL COAGULATION LAPAROSCOPIC BILATERAL         Family Status   Relation Name Status   • Mo  Alive   • Fa     • Sis     • Bro       Family History   Problem Relation Age of Onset   • Heart Disease Mother    • Heart Attack Mother    • Heart Attack Father 62       Social History     Tobacco Use   • Smoking status: Never Smoker   • Smokeless tobacco: Never Used   Substance Use Topics   • Alcohol use: No   • Drug use: No       Allergies: Pcn [penicillins] and Zoloft    Current Outpatient Medications Ordered in Epic   Medication Sig Dispense Refill   • lisinopril (PRINIVIL) 30 MG tablet Take 1 Tab by mouth every day. 90 Tab 0   • atorvastatin (LIPITOR) 10 MG Tab Take 1 Tab by mouth every day. 15 Tab 0   • bumetanide (BUMEX) 0.5 MG Tab Take 2 Tabs by mouth as needed (swelling in legs). 30 Tab 11     No current Epic-ordered " "facility-administered medications on file.      Review of Systems: See HPI above.   Constitutional: Negative for fever, chills, unplanned weight change and malaise/fatigue.   HENT: Negative for ear pain or tinnitus, nosebleeds, congestion, sore throat and neck pain.    Eyes: Negative for blurred or decreased vision.   Respiratory: Negative for cough, sputum production, shortness of breath and wheezing.    Cardiovascular: Negative for chest pain, palpitations, orthopnea, syncope and leg swelling.   Gastrointestinal: Negative for heartburn, nausea, vomiting and abdominal pain.   Genitourinary: Negative for dysuria, urgency and frequency.   Musculoskeletal: Negative for myalgias, back pain and joint pain.   Skin: Negative for rash, itching, nail changes or skin changes.   Neurological: Negative for dizziness, tremors, sensory change, focal weakness, tingling and headaches.   Endo/Heme/Allergies: Does not bruise/bleed easily.    Psychiatric/Behavioral: positive for depression, and memory loss. The patient is nervous/anxious and does have insomnia.  Pt does not use recreational drugs or excessive alcohol.  Negative for suicidal ideas       Exam:  /80   Pulse 76   Temp 36.4 °C (97.6 °F) (Temporal)   Resp 16   Ht 1.575 m (5' 2\")   Wt 89.8 kg (198 lb)   SpO2 97%   Body mass index is 36.21 kg/m².  General:  Obese female in NAD  Eyes: Conjunctiva clear, lids without ptosis, pupils equal and reactive to light accommodation.  ENMT: Nose and lips without deformity. Nasal mucosa and septum pink without evidence of purulent drainage.  Neck: Supple without masses upon palpation. Thyroid is not visibly enlarged.  Pulmonary: Normal effort. No rales, ronchi, or wheezing upon auscultation.   Cardiovascular: Regular rate and rhythm without murmur. Carotid and radial pulses are intact and equal bilaterally.   Extremities: No clubbing or cyanosis. Bilateral upper and lower extremities without edema.   Skin: Warm and dry.  No " obvious lesions.  Musculoskeletal: Normal gait.   Psych: Normal mood and affect.  Judgment and insight is abnormal.      Medical Decision Making & Discussion:   1. Mixed hyperlipidemia  Atorvastatin sent to pharmacy discussed diet changes    2. Benign essential hypertension  Lisinopril sent to pharmacy    3. Chronic kidney disease (CKD), stage III (moderate) ([facility])  Discussed diet changes  - MAGNESIUM; Future    4. Insomnia, unspecified type    - REFERRAL TO SLEEP STUDIES  - REFERRAL TO PSYCHIATRY    5. Mental and behavioral problem in adult    - REFERRAL TO SLEEP STUDIES  - REFERRAL TO PSYCHIATRY    6. Memory loss of unknown cause  Mother had late-onset dementia.     7. Pain in both lower extremities    - CBC WITHOUT DIFFERENTIAL; Future  - MAGNESIUM; Future  - HEMOGLOBIN A1C; Future    8. Excessive sweating    - HEMOGLOBIN A1C; Future    9. Obesity (BMI 30-39.9)    - Patient identified as having weight management issue.  Appropriate orders and counseling given.        Please note that this dictation was created using voice recognition software. I have made every reasonable attempt to correct obvious errors, but I expect that there are errors of grammar and possibly content that I did not discover before finalizing the note.    Return in about 2 weeks (around 3/26/2020) for repeat cognitive testing.

## 2020-03-12 NOTE — ASSESSMENT & PLAN NOTE
Possibly secondary to reduced kidney function, this is well- controlled at this time. She is is requesting medication refills.

## 2020-03-12 NOTE — ASSESSMENT & PLAN NOTE
Patient states she has PTSD. We are attempting to obtain record of this. She seems confused today initially not knowing what her trauma was and changing the subject about her trauma into insomnia, and requesting no medication but also requesting ambien for sleep. She scores a 17/30 on her MOCA today with reduced scores in all categories. She does well in language and moderately well in attention but has decreases in visuospatial and executive functioning and poor memory.

## 2020-03-12 NOTE — ASSESSMENT & PLAN NOTE
Has tried lunesta and ambien for this and wishes to restart ambien but doesn't want to go through another referral process as she recently went through a referral process with a clinic who, eventually informed her that they do not take her insurance. She is non-specific about her poor sleep, does snore and doesn't have excessive daytime somnolence.

## 2020-03-19 ENCOUNTER — NON-PROVIDER VISIT (OUTPATIENT)
Dept: MEDICAL GROUP | Facility: CLINIC | Age: 70
End: 2020-03-19
Payer: MEDICARE

## 2020-03-19 ENCOUNTER — HOSPITAL ENCOUNTER (OUTPATIENT)
Facility: MEDICAL CENTER | Age: 70
End: 2020-03-19
Attending: PHYSICIAN ASSISTANT
Payer: MEDICARE

## 2020-03-19 DIAGNOSIS — M79.605 PAIN IN BOTH LOWER EXTREMITIES: ICD-10-CM

## 2020-03-19 DIAGNOSIS — N18.30 CHRONIC KIDNEY DISEASE (CKD), STAGE III (MODERATE): ICD-10-CM

## 2020-03-19 DIAGNOSIS — R61 EXCESSIVE SWEATING: ICD-10-CM

## 2020-03-19 DIAGNOSIS — M79.604 PAIN IN BOTH LOWER EXTREMITIES: ICD-10-CM

## 2020-03-19 LAB
ERYTHROCYTE [DISTWIDTH] IN BLOOD BY AUTOMATED COUNT: 44 FL (ref 35.9–50)
HCT VFR BLD AUTO: 42.6 % (ref 37–47)
HGB BLD-MCNC: 13.9 G/DL (ref 12–16)
MAGNESIUM SERPL-MCNC: 1.9 MG/DL (ref 1.5–2.5)
MCH RBC QN AUTO: 30.4 PG (ref 27–33)
MCHC RBC AUTO-ENTMCNC: 32.6 G/DL (ref 33.6–35)
MCV RBC AUTO: 93.2 FL (ref 81.4–97.8)
PLATELET # BLD AUTO: 217 K/UL (ref 164–446)
PMV BLD AUTO: 11.4 FL (ref 9–12.9)
RBC # BLD AUTO: 4.57 M/UL (ref 4.2–5.4)
WBC # BLD AUTO: 7.4 K/UL (ref 4.8–10.8)

## 2020-03-19 PROCEDURE — 83735 ASSAY OF MAGNESIUM: CPT

## 2020-03-19 PROCEDURE — 36415 COLL VENOUS BLD VENIPUNCTURE: CPT | Performed by: PHYSICIAN ASSISTANT

## 2020-03-19 PROCEDURE — 85027 COMPLETE CBC AUTOMATED: CPT

## 2020-04-28 NOTE — PROGRESS NOTES
Order(s) created erroneously. Erroneous order ID: [id number]   Order moved by: TELLO YORK   Order move date/time: 04/28/2020 4:01 PM   Source Patient: [id number]   Source Contact: 04/23/2020   Destination Patient: [id number]   Destination Contact: 02/08/2016